# Patient Record
Sex: FEMALE | Race: WHITE | NOT HISPANIC OR LATINO | URBAN - NONMETROPOLITAN AREA
[De-identification: names, ages, dates, MRNs, and addresses within clinical notes are randomized per-mention and may not be internally consistent; named-entity substitution may affect disease eponyms.]

---

## 2019-09-13 ENCOUNTER — OFFICE VISIT (OUTPATIENT)
Dept: OBSTETRICS AND GYNECOLOGY | Facility: CLINIC | Age: 39
End: 2019-09-13

## 2019-09-13 VITALS
WEIGHT: 254.2 LBS | BODY MASS INDEX: 42.35 KG/M2 | DIASTOLIC BLOOD PRESSURE: 72 MMHG | HEIGHT: 65 IN | HEART RATE: 83 BPM | SYSTOLIC BLOOD PRESSURE: 120 MMHG

## 2019-09-13 DIAGNOSIS — E66.01 MORBID OBESITY WITH BMI OF 40.0-44.9, ADULT (HCC): ICD-10-CM

## 2019-09-13 DIAGNOSIS — E03.9 HYPOTHYROIDISM (ACQUIRED): ICD-10-CM

## 2019-09-13 DIAGNOSIS — L68.0 FEMALE HIRSUTISM: Primary | ICD-10-CM

## 2019-09-13 DIAGNOSIS — F32.81 PMDD (PREMENSTRUAL DYSPHORIC DISORDER): ICD-10-CM

## 2019-09-13 LAB
ALBUMIN SERPL-MCNC: 4.2 G/DL (ref 3.5–5)
ALBUMIN/GLOB SERPL: 1.3 G/DL (ref 1.1–1.8)
ALP SERPL-CCNC: 75 U/L (ref 38–126)
ALT SERPL W P-5'-P-CCNC: 21 U/L
ANION GAP SERPL CALCULATED.3IONS-SCNC: 11 MMOL/L (ref 5–15)
AST SERPL-CCNC: 22 U/L (ref 14–36)
BASOPHILS # BLD AUTO: 0.05 10*3/MM3 (ref 0–0.2)
BASOPHILS NFR BLD AUTO: 0.5 % (ref 0–1.5)
BILIRUB SERPL-MCNC: 0.5 MG/DL (ref 0.2–1.3)
BUN BLD-MCNC: 14 MG/DL (ref 7–23)
BUN/CREAT SERPL: 20 (ref 7–25)
CALCIUM SPEC-SCNC: 9.2 MG/DL (ref 8.4–10.2)
CHLORIDE SERPL-SCNC: 104 MMOL/L (ref 101–112)
CO2 SERPL-SCNC: 26 MMOL/L (ref 22–30)
CREAT BLD-MCNC: 0.7 MG/DL (ref 0.52–1.04)
DEPRECATED RDW RBC AUTO: 41.5 FL (ref 37–54)
EOSINOPHIL # BLD AUTO: 0.11 10*3/MM3 (ref 0–0.4)
EOSINOPHIL NFR BLD AUTO: 1.1 % (ref 0.3–6.2)
ERYTHROCYTE [DISTWIDTH] IN BLOOD BY AUTOMATED COUNT: 12.8 % (ref 12.3–15.4)
GFR SERPL CREATININE-BSD FRML MDRD: 94 ML/MIN/1.73 (ref 64–149)
GLOBULIN UR ELPH-MCNC: 3.2 GM/DL (ref 2.3–3.5)
GLUCOSE BLD-MCNC: 93 MG/DL (ref 70–99)
HCT VFR BLD AUTO: 39.4 % (ref 34–46.6)
HGB BLD-MCNC: 13.2 G/DL (ref 12–15.9)
LYMPHOCYTES # BLD AUTO: 2.67 10*3/MM3 (ref 0.7–3.1)
LYMPHOCYTES NFR BLD AUTO: 27.4 % (ref 19.6–45.3)
MCH RBC QN AUTO: 30.3 PG (ref 26.6–33)
MCHC RBC AUTO-ENTMCNC: 33.5 G/DL (ref 31.5–35.7)
MCV RBC AUTO: 90.6 FL (ref 79–97)
MONOCYTES # BLD AUTO: 0.75 10*3/MM3 (ref 0.1–0.9)
MONOCYTES NFR BLD AUTO: 7.7 % (ref 5–12)
NEUTROPHILS # BLD AUTO: 6.16 10*3/MM3 (ref 1.7–7)
NEUTROPHILS NFR BLD AUTO: 63.3 % (ref 42.7–76)
PLATELET # BLD AUTO: 359 10*3/MM3 (ref 140–450)
PMV BLD AUTO: 9.3 FL (ref 6–12)
POTASSIUM BLD-SCNC: 4.1 MMOL/L (ref 3.4–5)
PROT SERPL-MCNC: 7.4 G/DL (ref 6.3–8.6)
RBC # BLD AUTO: 4.35 10*6/MM3 (ref 3.77–5.28)
SODIUM BLD-SCNC: 141 MMOL/L (ref 137–145)
WBC NRBC COR # BLD: 9.74 10*3/MM3 (ref 3.4–10.8)

## 2019-09-13 PROCEDURE — 84402 ASSAY OF FREE TESTOSTERONE: CPT | Performed by: NURSE PRACTITIONER

## 2019-09-13 PROCEDURE — 99213 OFFICE O/P EST LOW 20 MIN: CPT | Performed by: NURSE PRACTITIONER

## 2019-09-13 PROCEDURE — 84144 ASSAY OF PROGESTERONE: CPT | Performed by: NURSE PRACTITIONER

## 2019-09-13 PROCEDURE — 84443 ASSAY THYROID STIM HORMONE: CPT | Performed by: NURSE PRACTITIONER

## 2019-09-13 PROCEDURE — 80053 COMPREHEN METABOLIC PANEL: CPT | Performed by: NURSE PRACTITIONER

## 2019-09-13 PROCEDURE — 85025 COMPLETE CBC W/AUTO DIFF WBC: CPT | Performed by: NURSE PRACTITIONER

## 2019-09-13 PROCEDURE — 83525 ASSAY OF INSULIN: CPT | Performed by: NURSE PRACTITIONER

## 2019-09-13 PROCEDURE — 84439 ASSAY OF FREE THYROXINE: CPT | Performed by: NURSE PRACTITIONER

## 2019-09-13 PROCEDURE — 84403 ASSAY OF TOTAL TESTOSTERONE: CPT | Performed by: NURSE PRACTITIONER

## 2019-09-13 RX ORDER — LEVOTHYROXINE SODIUM 88 UG/1
TABLET ORAL
Refills: 3 | COMMUNITY
Start: 2019-09-10 | End: 2020-02-25 | Stop reason: SDUPTHER

## 2019-09-13 NOTE — PROGRESS NOTES
"Subjective   Vanda Rendon is a 39 y.o. female.     History of Present Illness   Pt presents with numerous concerns and requesting to evaluate her hormones. Pt complains of weight gain and inability to lose it, mood swings, breast tenderness associated with PMS, hirsutism. Symptoms have really escalated over the last 6 months. Her moods and weight are her 2 biggest concerns. Strong family hx of diabetes. Pt states she has tried specific diets, cut out sodas and drinks only juice or water, exercised and still gained 1-2 lb a week. Her average weight previously was in the 170's and now she is 254lb today. She does have hypothyroidism and hasn't had it checked in 4-6 months. Her PCP has moved and pt is needing to establish care with a new PCP.     Pt states she feels like she has PMS 3 weeks out of the month, then the week after her period, feels great. Patient's last menstrual period was 2019 (exact date). Periods are 3-4 days, regular, heavy flow soaking an overnight pad every 1-2 hours the first 2 days, severe cramps, nausea, headaches, and breast tenderness. Pt denies drinking caffeine. She wears a sports bra to bed to help with the pain when it occurs.     Her mood swings are severe. She is tearful in discussing it. Feels she has outburst and depressive/anxious episodes. She has been on zoloft \"probably 20 years\", has never tried another antidepressant but is willing to. She was on klonopin and was weaned off too quickly. Ended up in the hospital as a result. She was switched to Buspar 5mg which she takes 1-3 x a day with some improvement.      5SAB. Pt denies every having trouble getting pregnant but had numerous SAB in first trimester. She notes excessive, dark, coarse, hair growth over the last few years on the chin, upper lip, chest and abdomen. She feels her hair is thinning on her head quickly. Has hx of ovarian cysts. She has 3 aunts with PCOS. She doesn't believe she has ever been tested. "     She recently went through a divorce and is no longer sexually active. But states in the 2 years prior to that, she had little to no desire for sex.     The following portions of the patient's history were reviewed and updated as appropriate: allergies, current medications, past family history, past medical history, past social history, past surgical history and problem list.    Review of Systems   Constitutional: Positive for fatigue and unexpected weight gain (with inability to lose it despite diet and exercise). Negative for chills, fever and unexpected weight loss.   Respiratory: Negative.    Cardiovascular: Negative.    Endocrine: Negative for cold intolerance, heat intolerance, polydipsia, polyphagia and polyuria.   Genitourinary: Positive for breast pain, menstrual problem (heavy, painful) and pelvic pain (LLQ worsened by movement. Agrees to further address at follow up when we do a pelvic exam).   Skin:        Hirsutism, hair loss   Psychiatric/Behavioral: Positive for agitation, depressed mood and stress. Negative for suicidal ideas. The patient is nervous/anxious.         Mood swings, yelling at her kids       Objective    Vitals:    09/13/19 0841   BP: 120/72   Pulse: 83         09/13/19  0841   Weight: 115 kg (254 lb 3.2 oz)     Body mass index is 42.3 kg/m².    Physical Exam   Constitutional: She is oriented to person, place, and time. She appears well-developed and well-nourished. She is morbidly obese.  Cardiovascular: Normal rate, regular rhythm and normal heart sounds.   Pulmonary/Chest: Effort normal and breath sounds normal.   Neurological: She is alert and oriented to person, place, and time.   Skin:   Moderate hirsutism on the chin, chest and abdomen   Psychiatric: Her mood appears anxious. She exhibits a depressed mood. She expresses no homicidal and no suicidal ideation.   Tearful talking about her moods   Vitals reviewed.        Assessment/Plan   Vanda was seen today for establish  care.    Diagnoses and all orders for this visit:    Female hirsutism  -     Testosterone, Free, Total  -     TSH  -     T4, Free    PMDD (premenstrual dysphoric disorder)  -     Testosterone, Free, Total  -     Progesterone  -     TSH  -     T4, Free    Morbid obesity with BMI of 40.0-44.9, adult (CMS/[facility])  -     Insulin, Total  -     CBC & Differential  -     Comprehensive Metabolic Panel  -     TSH  -     T4, Free  -     CBC Auto Differential    Hypothyroidism (acquired)  -     TSH  -     T4, Free      I discussed possible causes for her concerns and the probability that it may be multifactorial. Possibly hypothyroidism that isn't well controlled, PCOS, hormonal imbalance, hyperinsulinemia, etc. Possible plans may include: adjusting synthroid, taking spironolactone for hair changes, Wellbutrin for mood, weight and libido, metformin if insulin resistant. Pt is fasting today and agrees to obtain the labs above upon leaving my office. I will call with results and give a brief overview then schedule pt for follow up and a complete well woman exam. Pt agrees with this plan of care.

## 2019-09-14 LAB
PROGEST SERPL-MCNC: 4.51 NG/ML
T4 FREE SERPL-MCNC: 1.28 NG/DL (ref 0.93–1.7)
TSH SERPL DL<=0.05 MIU/L-ACNC: 1.89 UIU/ML (ref 0.27–4.2)

## 2019-09-16 LAB
INSULIN SERPL-ACNC: 12.2 UIU/ML (ref 2.6–24.9)
TESTOST FREE SERPL-MCNC: 0.4 PG/ML (ref 0–4.2)
TESTOST SERPL-MCNC: 11 NG/DL (ref 8–48)

## 2020-02-25 ENCOUNTER — OFFICE VISIT (OUTPATIENT)
Dept: OBSTETRICS AND GYNECOLOGY | Facility: CLINIC | Age: 40
End: 2020-02-25

## 2020-02-25 VITALS
BODY MASS INDEX: 44.08 KG/M2 | HEART RATE: 91 BPM | WEIGHT: 264.6 LBS | SYSTOLIC BLOOD PRESSURE: 118 MMHG | DIASTOLIC BLOOD PRESSURE: 72 MMHG | HEIGHT: 65 IN

## 2020-02-25 DIAGNOSIS — Z01.419 ENCOUNTER FOR GYNECOLOGICAL EXAMINATION WITH PAPANICOLAOU SMEAR OF CERVIX: Primary | ICD-10-CM

## 2020-02-25 DIAGNOSIS — E66.01 MORBID OBESITY WITH BMI OF 40.0-44.9, ADULT (HCC): ICD-10-CM

## 2020-02-25 DIAGNOSIS — F41.9 ANXIETY: ICD-10-CM

## 2020-02-25 DIAGNOSIS — F32.0 CURRENT MILD EPISODE OF MAJOR DEPRESSIVE DISORDER WITHOUT PRIOR EPISODE (HCC): ICD-10-CM

## 2020-02-25 DIAGNOSIS — E03.9 HYPOTHYROIDISM (ACQUIRED): ICD-10-CM

## 2020-02-25 PROCEDURE — G0123 SCREEN CERV/VAG THIN LAYER: HCPCS | Performed by: NURSE PRACTITIONER

## 2020-02-25 PROCEDURE — 99395 PREV VISIT EST AGE 18-39: CPT | Performed by: NURSE PRACTITIONER

## 2020-02-25 PROCEDURE — 87624 HPV HI-RISK TYP POOLED RSLT: CPT | Performed by: NURSE PRACTITIONER

## 2020-02-25 PROCEDURE — 99214 OFFICE O/P EST MOD 30 MIN: CPT | Performed by: NURSE PRACTITIONER

## 2020-02-25 RX ORDER — BUSPIRONE HYDROCHLORIDE 5 MG/1
5 TABLET ORAL 3 TIMES DAILY
Qty: 90 TABLET | Refills: 5 | Status: SHIPPED | OUTPATIENT
Start: 2020-02-25 | End: 2020-06-23

## 2020-02-25 RX ORDER — BUPROPION HYDROCHLORIDE 150 MG/1
150 TABLET ORAL EVERY MORNING
Qty: 30 TABLET | Refills: 2 | Status: SHIPPED | OUTPATIENT
Start: 2020-02-25 | End: 2020-03-17 | Stop reason: SDUPTHER

## 2020-02-25 RX ORDER — LEVOTHYROXINE SODIUM 88 UG/1
88 TABLET ORAL DAILY
Qty: 30 TABLET | Refills: 2 | Status: SHIPPED | OUTPATIENT
Start: 2020-02-25 | End: 2020-05-29 | Stop reason: SDUPTHER

## 2020-02-25 NOTE — PROGRESS NOTES
Subjective   Chief Complaint   Patient presents with   • Gynecologic Exam     well woman annual visit   • Follow-up     female hirsutism, PMDD, obesity and hypothyroidism     Vanda Rendon is a 39 y.o. year old  presenting to be seen for her annual exam.  Today she is also following up on labs/create a plan for PMDD, obesity, hypothyroidism, and hirsutism. I saw pt for this in September. Pt had a death in the family and some significant life stressors that prevented her RTC sooner. She has been without all of her medications (synthroid, zoloft and buspar) for 2 months. Her thyroid was well managed on synthroid 88mcg dose in September, I will give her refills until she can get established with a PCP for ongoing management. Pt would like to move forward with Wellbutrin as we had previously discussed. It has been many years since she took it. She felt zoloft was no longer helping her. She would like to keep the buspar on hand PRN.     Pt has been calorie tracking and restricting since I last saw her. She states in Nov and December she was in the 280's. She has lost about 15-16lb according to her home scale. She still has 10lb to go to get back to the weight she was in September. Pt states she had no idea that she was consuming 4615-0149 calories late at night alone due to snacking. She has drastically cut back on snacks, making healthier alternatives and using moderation. She looks forward to being more physically active as the weather improves but states she can already tell a difference, even in her mood, since she has been physically active.      Patient's last menstrual period was 2020 (exact date).    OB History        8    Para   3    Term                AB   5    Living           SAB   5    TAB        Ectopic        Molar        Multiple        Live Births                    Current birth control method: abstinence.    She is not sexually active.  In the past 12 months there  has not been new sexual partners.      Past 6 month menstrual history:    Cycle Frequency: regular, predictable and consistent every 28 - 32 days   Except in Nov and December she had spotting and skipped Dec all together. Normal in  and Feb.    Menstrual cycle character: flow is typically normal   Cycle Duration: 3 - 4   Number of heavy days of flows: 0   Dysmenorrhea: mild and is not affecting her activities of daily living   PMS: is not affecting her activities of daily living   Intermenstrual bleeding present: no   Post-coital bleeding present: no     She exercises regularly: yes.  She wears her seat belt:yes.  She has concerns about domestic violence: no.  Last colonoscopy: Never  Last DEXA: Never  Last MMG: Never  Last pap: 4/27/15  History of abnormal PAP: no    The following portions of the patient's history were reviewed and updated as appropriate:problem list, current medications, allergies, past family history, past medical history, past social history and past surgical history.    Social History    Tobacco Use      Smoking status: Former Smoker        Quit date:         Years since quittin.1      Smokeless tobacco: Never Used    Social History     Substance and Sexual Activity   Alcohol Use No      Review of Systems   Constitutional: Negative.  Negative for chills and fever.        Intentional weight loss   Respiratory: Negative.    Cardiovascular: Negative.    Gastrointestinal: Negative.  Negative for constipation and diarrhea.   Endocrine: Negative.  Negative for cold intolerance and heat intolerance.   Genitourinary: Negative.  Negative for menstrual problem, vaginal bleeding, vaginal discharge and vaginal pain.   Skin: Negative.         hirsutism   Neurological: Negative for dizziness, syncope, light-headedness and headaches.   Psychiatric/Behavioral: Negative for suicidal ideas. The patient is not nervous/anxious.         Mood improving with exercise but still feels like she needs to move  "forward with Wellbutrin         Objective   /72   Pulse 91   Ht 165.1 cm (65\")   Wt 120 kg (264 lb 9.6 oz)   LMP 02/18/2020 (Exact Date)   Breastfeeding No   BMI 44.03 kg/m²     General:  well developed; well nourished  no acute distress  obese - Body mass index is 44.03 kg/m².   Skin:  No suspicious lesions seen   Thyroid: not examined   Breasts:  Examined in supine position  Symmetric without masses or skin dimpling  Nipples normal without inversion, lesions or discharge  There are no palpable axillary nodes   Abdomen: soft, non-tender; no masses  no umbilical or inguinal hernias are present  no hepato-splenomegaly  Normal findings: bowel sounds normal   Cardiac: Heart sounds are normal.  Regular rate and rhythm without murmur, gallop or rub.   Resp: Normal expansion.  Clear to auscultation.  No rales, rhonchi, or wheezing.   Psych: alert,oriented, in NAD with a full range of affect, normal behavior and no psychotic features   Pelvis: Uterus:  Exam limited by  body habitus  Clinical staff was present for exam  External genitalia:  normal appearance of the external genitalia including Bartholin's and Neal's glands.  :  urethral meatus normal;  Vaginal:  normal pink mucosa without prolapse or lesions  Cervix:  normal appearance.  Uterus:  normal size, shape and consistency  Adnexa:  normal bimanual exam of the adnexa.  Rectal:  digital rectal exam not performed; anus visually normal appearing.     Lab Review   CBC, CMP, TSH and insulin, progesterone, testosterone    Imaging  No data reviewed       Vanda was seen today for gynecologic exam and follow-up.    Diagnoses and all orders for this visit:    Encounter for gynecological examination with Papanicolaou smear of cervix  -     Liquid-based Pap Smear, Screening    Morbid obesity with BMI of 40.0-44.9, adult (CMS/[facility])  -     buPROPion XL (WELLBUTRIN XL) 150 MG 24 hr tablet; Take 1 tablet by mouth Every Morning.    Current mild episode of major " depressive disorder without prior episode (CMS/HCC)  -     buPROPion XL (WELLBUTRIN XL) 150 MG 24 hr tablet; Take 1 tablet by mouth Every Morning.    Anxiety  -     busPIRone (BUSPAR) 5 MG tablet; Take 1 tablet by mouth 3 (Three) Times a Day.    Hypothyroidism (acquired)  -     levothyroxine (SYNTHROID, LEVOTHROID) 88 MCG tablet; Take 1 tablet by mouth Daily.    Pap results: I will send card in mail or call if abnormal. RTC annually for well woman exams. Monitor periods and let me know if they become irregular and bothersome again.    Pt is in agreement with beginning Wellbutrin for both PMDD and assistance with appetite/weight loss. I praised pt for the weight she has lost and the means by which she is doing it. Encouraged her to continue calorie tracking, increase exercise as she builds endurance,  Cutting back on carbs and sugary foods and choosing healthier options and using moderation. Goal of 1-2lb lose per week. Set small goals of 5-10lb to encourage her more along the way.     Continue with Buspar TID PRN. Pt wants to see how she feels without it at first but if she feels her anxiety beginning again, would want to start back on it.     Restart synthroid 88mcg. Encouraged pt to schedule with a PCP like Lisa Kramer or Larissa Munoz for ongoing monitoring and management.     Pt agrees with this plan. RTC in 3 months or sooner PRN.       This note was electronically signed.    Christianne Thompson, APRN  February 25, 2020

## 2020-02-28 LAB
GEN CATEG CVX/VAG CYTO-IMP: NORMAL
LAB AP CASE REPORT: NORMAL
LAB AP GYN ADDITIONAL INFORMATION: NORMAL
LAB AP GYN OTHER FINDINGS: NORMAL
PATH INTERP SPEC-IMP: NORMAL
STAT OF ADQ CVX/VAG CYTO-IMP: NORMAL

## 2020-03-02 LAB — HPV I/H RISK 4 DNA CVX QL PROBE+SIG AMP: NEGATIVE

## 2020-03-17 DIAGNOSIS — F32.0 CURRENT MILD EPISODE OF MAJOR DEPRESSIVE DISORDER WITHOUT PRIOR EPISODE (HCC): ICD-10-CM

## 2020-03-17 DIAGNOSIS — E66.01 MORBID OBESITY WITH BMI OF 40.0-44.9, ADULT (HCC): ICD-10-CM

## 2020-03-17 RX ORDER — BUPROPION HYDROCHLORIDE 300 MG/1
300 TABLET ORAL EVERY MORNING
Qty: 30 TABLET | Refills: 2 | Status: SHIPPED | OUTPATIENT
Start: 2020-03-17 | End: 2020-06-23 | Stop reason: SDUPTHER

## 2020-03-17 NOTE — PROGRESS NOTES
Wellbutrin XL 150mg was started 4 weeks ago. Pt states she hasn't noticed any improvement in her mood but does feel it is helping her appetite. She inquires about a dose change. Given the coronavirus pandemic and cancellation of all non urgent visits, I will go ahead and send XL 300mg tablets to finish the next 2 months, until pt is due for a follow up anyway. Pt agrees with this plan and will call with any concerns. She wants to call next month to schedule this appt.

## 2020-05-29 DIAGNOSIS — F32.0 CURRENT MILD EPISODE OF MAJOR DEPRESSIVE DISORDER WITHOUT PRIOR EPISODE (HCC): ICD-10-CM

## 2020-05-29 DIAGNOSIS — E03.9 HYPOTHYROIDISM (ACQUIRED): ICD-10-CM

## 2020-05-29 DIAGNOSIS — E66.01 MORBID OBESITY WITH BMI OF 40.0-44.9, ADULT (HCC): ICD-10-CM

## 2020-05-29 RX ORDER — BUPROPION HYDROCHLORIDE 300 MG/1
300 TABLET ORAL EVERY MORNING
Qty: 30 TABLET | Refills: 2 | OUTPATIENT
Start: 2020-05-29 | End: 2021-05-29

## 2020-06-01 RX ORDER — LEVOTHYROXINE SODIUM 88 UG/1
88 TABLET ORAL DAILY
Qty: 30 TABLET | Refills: 0 | Status: SHIPPED | OUTPATIENT
Start: 2020-06-01 | End: 2020-06-23 | Stop reason: SDUPTHER

## 2020-06-23 ENCOUNTER — OFFICE VISIT (OUTPATIENT)
Dept: OBSTETRICS AND GYNECOLOGY | Facility: CLINIC | Age: 40
End: 2020-06-23

## 2020-06-23 DIAGNOSIS — E66.01 MORBID OBESITY WITH BMI OF 40.0-44.9, ADULT (HCC): ICD-10-CM

## 2020-06-23 DIAGNOSIS — E03.9 HYPOTHYROIDISM (ACQUIRED): ICD-10-CM

## 2020-06-23 DIAGNOSIS — N92.6 IRREGULAR PERIODS: ICD-10-CM

## 2020-06-23 DIAGNOSIS — F32.0 CURRENT MILD EPISODE OF MAJOR DEPRESSIVE DISORDER WITHOUT PRIOR EPISODE (HCC): ICD-10-CM

## 2020-06-23 DIAGNOSIS — F32.81 PMDD (PREMENSTRUAL DYSPHORIC DISORDER): Primary | ICD-10-CM

## 2020-06-23 PROCEDURE — 99442 PR PHYS/QHP TELEPHONE EVALUATION 11-20 MIN: CPT | Performed by: NURSE PRACTITIONER

## 2020-06-23 RX ORDER — LEVOTHYROXINE SODIUM 88 UG/1
88 TABLET ORAL DAILY
Qty: 30 TABLET | Refills: 2 | Status: SHIPPED | OUTPATIENT
Start: 2020-06-23 | End: 2020-09-16 | Stop reason: SDUPTHER

## 2020-06-23 RX ORDER — BUPROPION HYDROCHLORIDE 300 MG/1
300 TABLET ORAL EVERY MORNING
Qty: 30 TABLET | Refills: 2 | Status: SHIPPED | OUTPATIENT
Start: 2020-06-23 | End: 2020-09-16 | Stop reason: SDUPTHER

## 2020-06-23 RX ORDER — MEDROXYPROGESTERONE ACETATE 5 MG/1
5 TABLET ORAL DAILY
Qty: 30 TABLET | Refills: 2 | Status: SHIPPED | OUTPATIENT
Start: 2020-06-23 | End: 2020-09-16 | Stop reason: SDUPTHER

## 2020-06-24 NOTE — PROGRESS NOTES
Subjective   Vanda Rendon is a 40 y.o. female.     You have chosen to receive care through a telephone visit. Do you consent to use a telephone visit for your medical care today? Yes    History of Present Illness   Pt presents for follow up on Wellbutrin XL 300mg. She was scheduled for follow up with COVID hit, so we talked on the phone and discussed that she was improving with her appetite but depression was still an issue. We increased her dose to 300mg on 3/17. Today she continues to say her appetite suppression with Wellbutrin is great. She believes she is 248lb which would correlate to 16lb loss. Pt praised for her efforts and success.     She does note her depression overall to be better but has concerns about irritability and anger before her period. She states is just seems worse lately whenever she skipped her period in May. She has since had one on 6/2/2020 and feels better for now. Her bleeding is heavy for the first 3 days, then tapers off over the next 2-3 days. Pt is not sexually active.     She continues to take Synthroid 88mcg without concern.     The following portions of the patient's history were reviewed and updated as appropriate: allergies, current medications, past family history, past medical history, past social history, past surgical history and problem list.    Review of Systems   Constitutional: Positive for appetite change (decreased on Wellbutrin). Negative for chills, fever, unexpected weight gain and unexpected weight loss (believes she is approx 248lb according to her home scales. intentional loss).   Respiratory: Negative.  Negative for shortness of breath.    Cardiovascular: Negative.  Negative for palpitations.   Endocrine:        Hypothyroidism   Genitourinary: Positive for menstrual problem (irregular, heavy) and pelvic pain (dysmenorrhea).   Neurological: Negative for dizziness, syncope and light-headedness.   Psychiatric/Behavioral: Positive for agitation (and anger  the week before her period. ). Negative for depressed mood. The patient is not nervous/anxious.        Objective   Physical Exam  Telephone Visit     Assessment/Plan   Vanda was seen today for med refill.    Diagnoses and all orders for this visit:    PMDD (premenstrual dysphoric disorder)  -     medroxyPROGESTERone (PROVERA) 5 MG tablet; Take 1 tablet by mouth Daily.    Irregular periods  -     medroxyPROGESTERone (PROVERA) 5 MG tablet; Take 1 tablet by mouth Daily.    Morbid obesity with BMI of 40.0-44.9, adult (CMS/HCC)  -     buPROPion XL (WELLBUTRIN XL) 300 MG 24 hr tablet; Take 1 tablet by mouth Every Morning.    Current mild episode of major depressive disorder without prior episode (CMS/HCC)  -     buPROPion XL (WELLBUTRIN XL) 300 MG 24 hr tablet; Take 1 tablet by mouth Every Morning.    Hypothyroidism (acquired)  -     levothyroxine (SYNTHROID, LEVOTHROID) 88 MCG tablet; Take 1 tablet by mouth Daily.    Continue Wellbutrin at 300mg XL tablets. She has had success with appetite and depression. Her concerns today are more irritability and anger before her period. She says it is affecting her little girls. We discussed options and a possible cause for PMDD and irregular periods is progesterone. I recommend we start daily provera 5 mg. We discussed realistic expectations of effectiveness and potential for irregular bleeding but in hopes that it would lighten it up and make it less severe. Encouraged pt to continue it during the transition phase unless something severe was going on. Encouraged her to call the office if she had any questions. She can begin this after her next period.     She has not been able to establish with a new PCP due to COVID. Continue Synthroid. And seek out a PCP ASAP so that they can continue monitoring and managing her thyroid.    RTC in 3 months. Schedule Sept 16th at 4:00PM.    Pt agrees with plan of care. All questions answered.     Total Time: 20 minutes.

## 2020-09-16 ENCOUNTER — OFFICE VISIT (OUTPATIENT)
Dept: OBSTETRICS AND GYNECOLOGY | Facility: CLINIC | Age: 40
End: 2020-09-16

## 2020-09-16 VITALS — BODY MASS INDEX: 41.6 KG/M2 | WEIGHT: 250 LBS

## 2020-09-16 DIAGNOSIS — N92.6 IRREGULAR PERIODS: ICD-10-CM

## 2020-09-16 DIAGNOSIS — E66.01 MORBID OBESITY WITH BMI OF 40.0-44.9, ADULT (HCC): ICD-10-CM

## 2020-09-16 DIAGNOSIS — F32.81 PMDD (PREMENSTRUAL DYSPHORIC DISORDER): ICD-10-CM

## 2020-09-16 DIAGNOSIS — E03.9 HYPOTHYROIDISM (ACQUIRED): ICD-10-CM

## 2020-09-16 DIAGNOSIS — F32.0 CURRENT MILD EPISODE OF MAJOR DEPRESSIVE DISORDER WITHOUT PRIOR EPISODE (HCC): ICD-10-CM

## 2020-09-16 PROCEDURE — 99442 PR PHYS/QHP TELEPHONE EVALUATION 11-20 MIN: CPT | Performed by: NURSE PRACTITIONER

## 2020-09-16 RX ORDER — LEVOTHYROXINE SODIUM 88 UG/1
88 TABLET ORAL DAILY
Qty: 30 TABLET | Refills: 5 | Status: SHIPPED | OUTPATIENT
Start: 2020-09-16 | End: 2021-05-17 | Stop reason: SDUPTHER

## 2020-09-16 RX ORDER — MEDROXYPROGESTERONE ACETATE 10 MG/1
10 TABLET ORAL DAILY
Qty: 30 TABLET | Refills: 5 | Status: SHIPPED | OUTPATIENT
Start: 2020-09-16 | End: 2021-03-05 | Stop reason: SDUPTHER

## 2020-09-16 RX ORDER — BUPROPION HYDROCHLORIDE 300 MG/1
300 TABLET ORAL EVERY MORNING
Qty: 30 TABLET | Refills: 5 | Status: SHIPPED | OUTPATIENT
Start: 2020-09-16 | End: 2021-05-14 | Stop reason: SDUPTHER

## 2020-09-18 NOTE — PROGRESS NOTES
Subjective   Vanda Rendon is a 40 y.o. female.     You have chosen to receive care through a telephone visit. Do you consent to use a telephone visit for your medical care today? Yes    History of Present Illness   Pt presents for follow up on Wellbutrin XL 300mg and provera 5mg. We increased her dose to 300mg on 3/17 and continued on that dose at last check in. But she noted her depression overall to be better but had concerns about irritability and anger before her period. Her bleeding was also heavy. Pt is not sexually active and declines contraception.       Today she continues to say her appetite suppression with Wellbutrin is great. She believes she has had a 16lb loss. Pt praised for her efforts and success. She feels a drastic improvement in her PMDD since provera initiation. She feels there is still room for improvement but she can tell more of a change with provera than she could when she just took wellbutrin alone. She inquires about a dose change if possible. Bleeding has also regulated, shortened to 5 days, and become more moderate flow.     She continues to take Synthroid 88mcg without concern.     The following portions of the patient's history were reviewed and updated as appropriate: allergies, current medications, past family history, past medical history, past social history, past surgical history and problem list.    Review of Systems   Constitutional: Positive for appetite change (decreased on Wellbutrin). Negative for chills, fever, unexpected weight gain and unexpected weight loss (Intentional loss ).   Endocrine:        Hypothyroidism   Genitourinary: Negative for menstrual problem (regular, moderate) and pelvic pain (improved).   Skin: Negative.    Neurological: Negative for dizziness, syncope, light-headedness and headache.   Psychiatric/Behavioral: Positive for agitation (and anger the week before her period improving significantly ). Negative for depressed mood. The patient is  not nervous/anxious.        Objective   Physical Exam  Telephone Visit     Assessment/Plan   Vanda was seen today for follow-up.    Diagnoses and all orders for this visit:    PMDD (premenstrual dysphoric disorder)  -     medroxyPROGESTERone (PROVERA) 10 MG tablet; Take 1 tablet by mouth Daily.    Irregular periods  -     medroxyPROGESTERone (PROVERA) 10 MG tablet; Take 1 tablet by mouth Daily.    Morbid obesity with BMI of 40.0-44.9, adult (CMS/HCC)  -     buPROPion XL (WELLBUTRIN XL) 300 MG 24 hr tablet; Take 1 tablet by mouth Every Morning.    Current mild episode of major depressive disorder without prior episode (CMS/HCC)  -     buPROPion XL (WELLBUTRIN XL) 300 MG 24 hr tablet; Take 1 tablet by mouth Every Morning.    Hypothyroidism (acquired)  -     levothyroxine (SYNTHROID, LEVOTHROID) 88 MCG tablet; Take 1 tablet by mouth Daily.    Continue Wellbutrin at 300mg XL tablets. She has had success with appetite and depression. Praised for weight loss efforts.    Continue provera but increase to 10mg. She has had no negative side effects and has had positive changes in PMDD and bleeding but still see's room for improvement. Pt can cut back to 5mg if she has any concerns after dose increase.     Continue Synthroid. Refills sent.     RTC in 6 months for well woman exam or sooner PRN.     Pt agrees with plan of care. All questions answered.     Total Time: 15 minutes.

## 2021-03-05 DIAGNOSIS — F32.81 PMDD (PREMENSTRUAL DYSPHORIC DISORDER): ICD-10-CM

## 2021-03-05 DIAGNOSIS — N92.6 IRREGULAR PERIODS: ICD-10-CM

## 2021-03-05 RX ORDER — MEDROXYPROGESTERONE ACETATE 10 MG/1
10 TABLET ORAL DAILY
Qty: 30 TABLET | Refills: 0 | Status: SHIPPED | OUTPATIENT
Start: 2021-03-05 | End: 2021-05-14 | Stop reason: SDUPTHER

## 2021-04-12 DIAGNOSIS — F32.0 CURRENT MILD EPISODE OF MAJOR DEPRESSIVE DISORDER WITHOUT PRIOR EPISODE (HCC): ICD-10-CM

## 2021-04-12 DIAGNOSIS — E66.01 MORBID OBESITY WITH BMI OF 40.0-44.9, ADULT (HCC): ICD-10-CM

## 2021-04-12 DIAGNOSIS — E03.9 HYPOTHYROIDISM (ACQUIRED): ICD-10-CM

## 2021-04-12 RX ORDER — BUPROPION HYDROCHLORIDE 300 MG/1
300 TABLET ORAL EVERY MORNING
Qty: 30 TABLET | Refills: 5 | OUTPATIENT
Start: 2021-04-12 | End: 2022-04-12

## 2021-04-12 RX ORDER — LEVOTHYROXINE SODIUM 88 UG/1
88 TABLET ORAL DAILY
Qty: 30 TABLET | Refills: 5 | OUTPATIENT
Start: 2021-04-12

## 2021-05-14 ENCOUNTER — LAB (OUTPATIENT)
Dept: LAB | Facility: OTHER | Age: 41
End: 2021-05-14

## 2021-05-14 ENCOUNTER — OFFICE VISIT (OUTPATIENT)
Dept: OBSTETRICS AND GYNECOLOGY | Facility: CLINIC | Age: 41
End: 2021-05-14

## 2021-05-14 VITALS
SYSTOLIC BLOOD PRESSURE: 118 MMHG | HEIGHT: 65 IN | WEIGHT: 265.2 LBS | DIASTOLIC BLOOD PRESSURE: 76 MMHG | BODY MASS INDEX: 44.18 KG/M2 | HEART RATE: 96 BPM

## 2021-05-14 DIAGNOSIS — E55.9 VITAMIN D DEFICIENCY: ICD-10-CM

## 2021-05-14 DIAGNOSIS — E03.9 HYPOTHYROIDISM (ACQUIRED): ICD-10-CM

## 2021-05-14 DIAGNOSIS — Z01.419 ENCOUNTER FOR GYNECOLOGICAL EXAMINATION WITH PAPANICOLAOU SMEAR OF CERVIX: Primary | ICD-10-CM

## 2021-05-14 DIAGNOSIS — F32.0 CURRENT MILD EPISODE OF MAJOR DEPRESSIVE DISORDER WITHOUT PRIOR EPISODE (HCC): ICD-10-CM

## 2021-05-14 DIAGNOSIS — E66.01 MORBID OBESITY WITH BMI OF 40.0-44.9, ADULT (HCC): ICD-10-CM

## 2021-05-14 DIAGNOSIS — Z00.00 LABORATORY EXAMINATION ORDERED AS PART OF A ROUTINE GENERAL MEDICAL EXAMINATION: ICD-10-CM

## 2021-05-14 DIAGNOSIS — F32.81 PMDD (PREMENSTRUAL DYSPHORIC DISORDER): ICD-10-CM

## 2021-05-14 DIAGNOSIS — N92.6 IRREGULAR PERIODS: ICD-10-CM

## 2021-05-14 LAB
ALBUMIN SERPL-MCNC: 3.9 G/DL (ref 3.5–5)
ALBUMIN/GLOB SERPL: 1.2 G/DL (ref 1.1–1.8)
ALP SERPL-CCNC: 106 U/L (ref 38–126)
ALT SERPL W P-5'-P-CCNC: 20 U/L
ANION GAP SERPL CALCULATED.3IONS-SCNC: 9 MMOL/L (ref 5–15)
AST SERPL-CCNC: 24 U/L (ref 14–36)
BASOPHILS # BLD AUTO: 0.1 10*3/MM3 (ref 0–0.2)
BASOPHILS NFR BLD AUTO: 1.2 % (ref 0–1.5)
BILIRUB SERPL-MCNC: 0.3 MG/DL (ref 0.2–1.3)
BUN SERPL-MCNC: 10 MG/DL (ref 7–23)
BUN/CREAT SERPL: 12.5 (ref 7–25)
CALCIUM SPEC-SCNC: 9 MG/DL (ref 8.4–10.2)
CHLORIDE SERPL-SCNC: 101 MMOL/L (ref 101–112)
CHOLEST SERPL-MCNC: 248 MG/DL (ref 150–200)
CO2 SERPL-SCNC: 25 MMOL/L (ref 22–30)
CREAT SERPL-MCNC: 0.8 MG/DL (ref 0.52–1.04)
DEPRECATED RDW RBC AUTO: 39.9 FL (ref 37–54)
EOSINOPHIL # BLD AUTO: 0.14 10*3/MM3 (ref 0–0.4)
EOSINOPHIL NFR BLD AUTO: 1.6 % (ref 0.3–6.2)
ERYTHROCYTE [DISTWIDTH] IN BLOOD BY AUTOMATED COUNT: 12.4 % (ref 12.3–15.4)
GFR SERPL CREATININE-BSD FRML MDRD: 80 ML/MIN/1.73 (ref 64–149)
GLOBULIN UR ELPH-MCNC: 3.2 GM/DL (ref 2.3–3.5)
GLUCOSE SERPL-MCNC: 107 MG/DL (ref 70–99)
HCT VFR BLD AUTO: 39 % (ref 34–46.6)
HDLC SERPL-MCNC: 35 MG/DL (ref 40–59)
HGB BLD-MCNC: 13.3 G/DL (ref 12–15.9)
LDLC SERPL CALC-MCNC: 185 MG/DL
LDLC/HDLC SERPL: 5.23 {RATIO} (ref 0–3.22)
LYMPHOCYTES # BLD AUTO: 2.25 10*3/MM3 (ref 0.7–3.1)
LYMPHOCYTES NFR BLD AUTO: 26 % (ref 19.6–45.3)
MCH RBC QN AUTO: 30.6 PG (ref 26.6–33)
MCHC RBC AUTO-ENTMCNC: 34.1 G/DL (ref 31.5–35.7)
MCV RBC AUTO: 89.7 FL (ref 79–97)
MONOCYTES # BLD AUTO: 0.51 10*3/MM3 (ref 0.1–0.9)
MONOCYTES NFR BLD AUTO: 5.9 % (ref 5–12)
NEUTROPHILS NFR BLD AUTO: 5.65 10*3/MM3 (ref 1.7–7)
NEUTROPHILS NFR BLD AUTO: 65.3 % (ref 42.7–76)
PLATELET # BLD AUTO: 383 10*3/MM3 (ref 140–450)
PMV BLD AUTO: 9 FL (ref 6–12)
POTASSIUM SERPL-SCNC: 4.3 MMOL/L (ref 3.4–5)
PROT SERPL-MCNC: 7.1 G/DL (ref 6.3–8.6)
RBC # BLD AUTO: 4.35 10*6/MM3 (ref 3.77–5.28)
SODIUM SERPL-SCNC: 135 MMOL/L (ref 137–145)
TRIGL SERPL-MCNC: 150 MG/DL
VLDLC SERPL-MCNC: 28 MG/DL (ref 5–40)
WBC # BLD AUTO: 8.65 10*3/MM3 (ref 3.4–10.8)

## 2021-05-14 PROCEDURE — 80053 COMPREHEN METABOLIC PANEL: CPT | Performed by: NURSE PRACTITIONER

## 2021-05-14 PROCEDURE — 99395 PREV VISIT EST AGE 18-39: CPT | Performed by: NURSE PRACTITIONER

## 2021-05-14 PROCEDURE — 83036 HEMOGLOBIN GLYCOSYLATED A1C: CPT | Performed by: NURSE PRACTITIONER

## 2021-05-14 PROCEDURE — 82607 VITAMIN B-12: CPT | Performed by: NURSE PRACTITIONER

## 2021-05-14 PROCEDURE — 82306 VITAMIN D 25 HYDROXY: CPT | Performed by: NURSE PRACTITIONER

## 2021-05-14 PROCEDURE — 84443 ASSAY THYROID STIM HORMONE: CPT | Performed by: NURSE PRACTITIONER

## 2021-05-14 PROCEDURE — 84439 ASSAY OF FREE THYROXINE: CPT | Performed by: NURSE PRACTITIONER

## 2021-05-14 PROCEDURE — 80061 LIPID PANEL: CPT | Performed by: NURSE PRACTITIONER

## 2021-05-14 PROCEDURE — 85025 COMPLETE CBC W/AUTO DIFF WBC: CPT | Performed by: NURSE PRACTITIONER

## 2021-05-14 RX ORDER — BUPROPION HYDROCHLORIDE 150 MG/1
150 TABLET ORAL EVERY MORNING
Qty: 30 TABLET | Refills: 5 | Status: SHIPPED | OUTPATIENT
Start: 2021-05-14 | End: 2022-12-20 | Stop reason: DRUGHIGH

## 2021-05-14 RX ORDER — MEDROXYPROGESTERONE ACETATE 5 MG/1
5 TABLET ORAL DAILY
Qty: 30 TABLET | Refills: 5 | Status: SHIPPED | OUTPATIENT
Start: 2021-05-14 | End: 2022-08-19

## 2021-05-14 RX ORDER — BUSPIRONE HYDROCHLORIDE 10 MG/1
10 TABLET ORAL 2 TIMES DAILY
Qty: 60 TABLET | Refills: 1 | Status: SHIPPED | OUTPATIENT
Start: 2021-05-14 | End: 2021-06-28

## 2021-05-15 LAB
25(OH)D3 SERPL-MCNC: 9.9 NG/ML (ref 30–100)
HBA1C MFR BLD: 5.4 % (ref 4.8–5.6)
T4 FREE SERPL-MCNC: 1.21 NG/DL (ref 0.93–1.7)
TSH SERPL DL<=0.05 MIU/L-ACNC: 3.43 UIU/ML (ref 0.27–4.2)
VIT B12 BLD-MCNC: 283 PG/ML (ref 211–946)

## 2021-05-17 RX ORDER — ERGOCALCIFEROL 1.25 MG/1
50000 CAPSULE ORAL WEEKLY
Qty: 5 CAPSULE | Refills: 5 | Status: SHIPPED | OUTPATIENT
Start: 2021-05-17

## 2021-05-17 RX ORDER — LEVOTHYROXINE SODIUM 88 UG/1
88 TABLET ORAL DAILY
Qty: 30 TABLET | Refills: 5 | Status: SHIPPED | OUTPATIENT
Start: 2021-05-17 | End: 2022-12-22 | Stop reason: SDUPTHER

## 2021-05-17 NOTE — PROGRESS NOTES
Subjective   Chief Complaint   Patient presents with   • Gynecologic Exam     wwe   • medication refills     possibly needs labs for thyroid     Vanda Rendon is a 40 y.o. year old  presenting to be seen for her annual exam.  Today she is needing refills on Wellbutrin, synthroid and provera.     She has PMDD, obesity, hypothyroidism, and hirsutism.  Her thyroid was previously well managed on synthroid 88mcg dose, I will recheck labs today. Pt had done well on Wellbutrin but we increased dose to 300mg XL. She complains today of feeling anxious and worked up easily. There is a possibility the higher dose wellbutrin is contributing. She took Buspar previously and denies any negative side effects but I believe she was having trouble remembering to take if multiple times a day. She feels she could handle BID but not TID. I recommend we go up from 5mg to 10mg and do BID, not PRN as she had previously taken it. Pt is agreeable to this. With the jittery/anxiousness, I do recommend we back down on the Wellbutrin to 150mg XL. Pt agrees to this as well.     Pt is struggling with weight loss. She had gotten down closer to 250lb but is back up to 265lb today. She is inquiring about the best diet to follow. She is reading so much conflicting information online and struggling with sustainability of fad diets. We discussed weight watchers at length. She did it many, many years ago. I provided some info on it and pt agrees to start here. I explained the lifestyle change is much better and sustainable than fad dieting. Pt voices understanding.     She previously wanted to increase the provera to 10mg but today states she couldn't tolerate it due to mood changes. She has been cutting it in half daily.     Patient's last menstrual period was 2021 (exact date).    OB History        6    Para   3    Term                AB   3    Living   3       SAB   3    TAB        Ectopic        Molar        Multiple         Live Births                    Current birth control method: abstinence.    She is not sexually active.  In the past 12 months there has not been new sexual partners.      Past 6 month menstrual history:    Cycle Frequency: regular, predictable and consistent every 28 - 32 days    Menstrual cycle character: flow is typically normal   Cycle Duration: 4-5   Number of heavy days of flows: 0   Dysmenorrhea: mild and is not affecting her activities of daily living   PMS: is not affecting her activities of daily living   Intermenstrual bleeding present: no   Post-coital bleeding present: no     She exercises regularly: no.  She wears her seat belt:yes.  She has concerns about domestic violence: no.  Last colonoscopy: Never  Last DEXA: Never  Last MMG: Never  Last pap: 20  History of abnormal PAP: yes    The following portions of the patient's history were reviewed and updated as appropriate:problem list, current medications, allergies, past family history, past medical history, past social history and past surgical history.    Social History    Tobacco Use      Smoking status: Former Smoker        Quit date:         Years since quittin.3      Smokeless tobacco: Never Used    Social History     Substance and Sexual Activity   Alcohol Use Yes    Comment: occasionally      Review of Systems   Constitutional: Positive for unexpected weight change (weight gain). Negative for chills and fever.   Respiratory: Negative.    Cardiovascular: Negative.    Gastrointestinal: Negative.  Negative for constipation and diarrhea.   Endocrine: Negative.  Negative for cold intolerance and heat intolerance.   Genitourinary: Negative.  Negative for menstrual problem, vaginal bleeding, vaginal discharge and vaginal pain.   Skin: Negative.         hirsutism   Neurological: Negative for dizziness, syncope, light-headedness and headaches.   Psychiatric/Behavioral: Negative for suicidal ideas.        PMDD, anxiety, depression     "     Objective   /76   Pulse 96   Ht 165.1 cm (65\")   Wt 120 kg (265 lb 3.2 oz)   LMP 04/28/2021 (Exact Date)   Breastfeeding No   BMI 44.13 kg/m²     General:  well developed; well nourished  no acute distress  obese - Body mass index is 44.13 kg/m².   Skin:  No suspicious lesions seen   Thyroid: normal to inspection and palpation   Breasts:  Examined in supine position  Symmetric without masses or skin dimpling  Nipples normal without inversion, lesions or discharge  There are no palpable axillary nodes   Abdomen: soft, non-tender; no masses  no umbilical or inguinal hernias are present  no hepato-splenomegaly  Normal findings: bowel sounds normal   Cardiac: Heart sounds are normal.  Regular rate and rhythm without murmur, gallop or rub.   Resp: Normal expansion.  Clear to auscultation.  No rales, rhonchi, or wheezing.   Psych: alert,oriented, in NAD with a full range of affect, normal behavior and no psychotic features   Pelvis: Uterus:  Exam limited by  body habitus  Clinical staff was present for exam  External genitalia:  normal appearance of the external genitalia including Bartholin's and [place]'s glands.  :  urethral meatus normal;  Vaginal:  normal pink mucosa without prolapse or lesions  Cervix:  normal appearance.  Uterus:  normal size, shape and consistency  Adnexa:  normal bimanual exam of the adnexa.  Rectal:  digital rectal exam not performed; anus visually normal appearing.     Lab Review     Component      Latest Ref Rng & Units 5/14/2021   WBC      3.40 - 10.80 10*3/mm3 8.65   RBC      3.77 - 5.28 10*6/mm3 4.35   Hemoglobin      12.0 - 15.9 g/dL 13.3   Hematocrit      34.0 - 46.6 % 39.0   MCV      79.0 - 97.0 fL 89.7   MCH      26.6 - 33.0 pg 30.6   MCHC      31.5 - 35.7 g/dL 34.1   RDW      12.3 - 15.4 % 12.4   RDW-SD      37.0 - 54.0 fl 39.9   MPV      6.0 - 12.0 fL 9.0   Platelets      140 - 450 10*3/mm3 383   Neutrophil Rel %      42.7 - 76.0 % 65.3   Lymphocyte Rel %      19.6 - " 45.3 % 26.0   Monocyte Rel %      5.0 - 12.0 % 5.9   Eosinophil Rel %      0.3 - 6.2 % 1.6   Basophil Rel %      0.0 - 1.5 % 1.2   Neutrophils Absolute      1.70 - 7.00 10*3/mm3 5.65   Lymphocytes Absolute      0.70 - 3.10 10*3/mm3 2.25   Monocytes Absolute      0.10 - 0.90 10*3/mm3 0.51   Eosinophils Absolute      0.00 - 0.40 10*3/mm3 0.14   Basophils Absolute      0.00 - 0.20 10*3/mm3 0.10   Glucose      70 - 99 mg/dL 107 (H)   BUN      7 - 23 mg/dL 10   Creatinine      0.52 - 1.04 mg/dL 0.80   Sodium      137 - 145 mmol/L 135 (L)   Potassium      3.4 - 5.0 mmol/L 4.3   Chloride      101 - 112 mmol/L 101   CO2      22.0 - 30.0 mmol/L 25.0   Calcium      8.4 - 10.2 mg/dL 9.0   Total Protein      6.3 - 8.6 g/dL 7.1   Albumin      3.50 - 5.00 g/dL 3.90   ALT (SGPT)      <=35 U/L 20   AST (SGOT)      14 - 36 U/L 24   Alkaline Phosphatase      38 - 126 U/L 106   Total Bilirubin      0.2 - 1.3 mg/dL 0.3   eGFR Non  Am      64 - 149 mL/min/1.73 80   Globulin      2.3 - 3.5 gm/dL 3.2   A/G Ratio      1.1 - 1.8 g/dL 1.2   BUN/Creatinine Ratio      7.0 - 25.0 12.5   Anion Gap      5.0 - 15.0 mmol/L 9.0   Total Cholesterol      150 - 200 mg/dL 248 (H)   Triglycerides      <=150 mg/dL 150   HDL Cholesterol      40 - 59 mg/dL 35 (L)   LDL Cholesterol       <=100 mg/dL 185 (H)   VLDL Cholesterol      5 - 40 mg/dL 28   LDL/HDL Ratio      0.00 - 3.22 5.23 (H)   TSH Baseline      0.270 - 4.200 uIU/mL 3.430   Free T4      0.93 - 1.70 ng/dL 1.21   25 Hydroxy, Vitamin D      30.0 - 100.0 ng/ml 9.9 (L)   Vitamin B-12      211 - 946 pg/mL 283   Hemoglobin A1C      4.80 - 5.60 % 5.40     Imaging  No data reviewed       Diagnoses and all orders for this visit:    1. Encounter for gynecological examination with Papanicolaou smear of cervix (Primary)  -     Liquid-based Pap Smear, Screening    2. Laboratory examination ordered as part of a routine general medical examination  -     TSH  -     T4, Free  -     CBC Auto Differential  -      Comprehensive Metabolic Panel  -     Vitamin D 25 Hydroxy  -     Vitamin B12  -     Hemoglobin A1c  -     Lipid Panel    3. Morbid obesity with BMI of 40.0-44.9, adult (CMS/[facility])  -     buPROPion XL (WELLBUTRIN XL) 150 MG 24 hr tablet; Take 1 tablet by mouth Every Morning.  Dispense: 30 tablet; Refill: 5  -     TSH  -     T4, Free  -     CBC Auto Differential  -     Comprehensive Metabolic Panel  -     Vitamin D 25 Hydroxy  -     Vitamin B12  -     Hemoglobin A1c  -     Lipid Panel    4. PMDD (premenstrual dysphoric disorder)  -     medroxyPROGESTERone (PROVERA) 5 MG tablet; Take 1 tablet by mouth Daily.  Dispense: 30 tablet; Refill: 5  -     busPIRone (BUSPAR) 10 MG tablet; Take 1 tablet by mouth 2 (two) times a day.  Dispense: 60 tablet; Refill: 1    5. Irregular periods  -     medroxyPROGESTERone (PROVERA) 5 MG tablet; Take 1 tablet by mouth Daily.  Dispense: 30 tablet; Refill: 5    6. Current mild episode of major depressive disorder without prior episode (CMS/[facility])  -     buPROPion XL (WELLBUTRIN XL) 150 MG 24 hr tablet; Take 1 tablet by mouth Every Morning.  Dispense: 30 tablet; Refill: 5    7. Hypothyroidism (acquired)  -     levothyroxine (SYNTHROID, LEVOTHROID) 88 MCG tablet; Take 1 tablet by mouth Daily.  Dispense: 30 tablet; Refill: 5    8. Vitamin D deficiency  -     vitamin D (ERGOCALCIFEROL) 1.25 MG (78510 UT) capsule capsule; Take 1 capsule by mouth 1 (One) Time Per Week.  Dispense: 5 capsule; Refill: 5    Pap results: I will send card in mail or call if abnormal. RTC annually for well woman exams.     MMGs beginning annually at 42. Encouraged SBE monthly.     Thyroid labs are normal. Continue on synthroid 88mcg daily. Refills sent x 6 months.     Vit D is extremely low at only 9. Begin weekly supplement. Script sent to pharmacy.     B12 is on the low end of normal. Can start with OTC sublingual B12 supplement.     A1C is good but glucose is slightly elevated at 107. Cholesterol is elevated. Needs  to see PCP for further management of this. CBC is good. I spent 15 minutes specifically counseling on diet and exercise. I provided information on Weight watchers. We discussed sustainable lifestyle changes. Low carb, low fat, portion control. Encouraged exercise daily. Pt voices understanding and agrees with this plan.     Reduce Wellbutrin XL to 150mg in the AM since she is having issues with anxiety. Begin Buspar again but increase to 10mg BID every day. Not PRN as she was previously taking. Pt voices understanding of these changes.     Periods are well managed but PMDD is still an issue. I believe the wellbutrin and buspar changes will assist more than provera. Continue taking just 5mg daily of provera. Pt agrees with this plan.     FU in 3 months or sooner PRN.     This note was electronically signed.    Christianne Thompson, APRN  May 17, 2021

## 2021-05-18 LAB
LAB AP CASE REPORT: NORMAL
PATH INTERP SPEC-IMP: NORMAL

## 2021-06-28 DIAGNOSIS — F32.81 PMDD (PREMENSTRUAL DYSPHORIC DISORDER): ICD-10-CM

## 2021-06-28 RX ORDER — BUSPIRONE HYDROCHLORIDE 10 MG/1
10 TABLET ORAL 2 TIMES DAILY
Qty: 60 TABLET | Refills: 0 | Status: SHIPPED | OUTPATIENT
Start: 2021-06-28 | End: 2022-12-20 | Stop reason: SDUPTHER

## 2021-09-15 ENCOUNTER — LAB (OUTPATIENT)
Dept: LAB | Facility: OTHER | Age: 41
End: 2021-09-15

## 2021-09-15 PROCEDURE — U0004 COV-19 TEST NON-CDC HGH THRU: HCPCS | Performed by: NURSE PRACTITIONER

## 2021-09-19 ENCOUNTER — TELEPHONE (OUTPATIENT)
Dept: URGENT CARE | Facility: CLINIC | Age: 41
End: 2021-09-19

## 2021-09-19 DIAGNOSIS — J06.9 ACUTE URI: Primary | ICD-10-CM

## 2021-09-19 RX ORDER — AMOXICILLIN 500 MG/1
500 CAPSULE ORAL 2 TIMES DAILY
Qty: 20 CAPSULE | Refills: 0 | Status: SHIPPED | OUTPATIENT
Start: 2021-09-19 | End: 2021-09-29

## 2021-09-19 NOTE — TELEPHONE ENCOUNTER
Patient called requesting that her antibiotic be changed.  She states that the doxycycline prescribed on 9/15/21 caused her stomach upset so she stopped it a few days ago.  She reports that since stopping the doxycycline, her stomach complaints have resolved.  She states that she has taken amoxicillin in the past with no problems and would like to have this medication instead if possible.  Amoxicillin 500 mg BID x10 days is sent in to her pharmacy.  She is reminded to follow up with her PCP for a recheck and to go to the ER with any worsening or sudden changes in her condition. Understanding and agreement is verbalized.

## 2022-02-07 DIAGNOSIS — E55.9 VITAMIN D DEFICIENCY: ICD-10-CM

## 2022-02-07 DIAGNOSIS — F32.0 CURRENT MILD EPISODE OF MAJOR DEPRESSIVE DISORDER WITHOUT PRIOR EPISODE: ICD-10-CM

## 2022-02-07 DIAGNOSIS — E66.01 MORBID OBESITY WITH BMI OF 40.0-44.9, ADULT: ICD-10-CM

## 2022-02-07 DIAGNOSIS — E03.9 HYPOTHYROIDISM (ACQUIRED): ICD-10-CM

## 2022-02-07 RX ORDER — ERGOCALCIFEROL 1.25 MG/1
50000 CAPSULE ORAL WEEKLY
Qty: 5 CAPSULE | Refills: 5 | OUTPATIENT
Start: 2022-02-07

## 2022-02-07 RX ORDER — BUPROPION HYDROCHLORIDE 150 MG/1
150 TABLET ORAL EVERY MORNING
Qty: 30 TABLET | Refills: 5 | OUTPATIENT
Start: 2022-02-07

## 2022-02-07 RX ORDER — LEVOTHYROXINE SODIUM 88 UG/1
88 TABLET ORAL DAILY
Qty: 30 TABLET | Refills: 5 | OUTPATIENT
Start: 2022-02-07

## 2022-12-20 ENCOUNTER — LAB (OUTPATIENT)
Dept: LAB | Facility: OTHER | Age: 42
End: 2022-12-20

## 2022-12-20 ENCOUNTER — OFFICE VISIT (OUTPATIENT)
Dept: OBSTETRICS AND GYNECOLOGY | Facility: CLINIC | Age: 42
End: 2022-12-20

## 2022-12-20 VITALS
HEART RATE: 91 BPM | WEIGHT: 241 LBS | SYSTOLIC BLOOD PRESSURE: 110 MMHG | HEIGHT: 65 IN | BODY MASS INDEX: 40.15 KG/M2 | DIASTOLIC BLOOD PRESSURE: 76 MMHG

## 2022-12-20 DIAGNOSIS — L65.9 HAIR LOSS: ICD-10-CM

## 2022-12-20 DIAGNOSIS — Z01.419 ENCOUNTER FOR GYNECOLOGICAL EXAMINATION WITH PAPANICOLAOU SMEAR OF CERVIX: Primary | ICD-10-CM

## 2022-12-20 DIAGNOSIS — E03.9 HYPOTHYROIDISM (ACQUIRED): ICD-10-CM

## 2022-12-20 DIAGNOSIS — E66.01 MORBID OBESITY WITH BMI OF 40.0-44.9, ADULT: ICD-10-CM

## 2022-12-20 DIAGNOSIS — Z12.31 SCREENING MAMMOGRAM, ENCOUNTER FOR: ICD-10-CM

## 2022-12-20 DIAGNOSIS — F32.81 PMDD (PREMENSTRUAL DYSPHORIC DISORDER): ICD-10-CM

## 2022-12-20 DIAGNOSIS — F32.0 CURRENT MILD EPISODE OF MAJOR DEPRESSIVE DISORDER WITHOUT PRIOR EPISODE: ICD-10-CM

## 2022-12-20 DIAGNOSIS — R53.83 FATIGUE, UNSPECIFIED TYPE: ICD-10-CM

## 2022-12-20 LAB
ALBUMIN SERPL-MCNC: 4.3 G/DL (ref 3.5–5)
ALBUMIN/GLOB SERPL: 1.3 G/DL (ref 1.1–1.8)
ALP SERPL-CCNC: 68 U/L (ref 38–126)
ALT SERPL W P-5'-P-CCNC: 19 U/L
ANION GAP SERPL CALCULATED.3IONS-SCNC: 9 MMOL/L (ref 5–15)
AST SERPL-CCNC: 20 U/L (ref 14–36)
BILIRUB SERPL-MCNC: 0.4 MG/DL (ref 0.2–1.3)
BUN SERPL-MCNC: 7 MG/DL (ref 7–23)
BUN/CREAT SERPL: 9.7 (ref 7–25)
CALCIUM SPEC-SCNC: 8.5 MG/DL (ref 8.4–10.2)
CHLORIDE SERPL-SCNC: 104 MMOL/L (ref 101–112)
CHOLEST SERPL-MCNC: 198 MG/DL (ref 150–200)
CO2 SERPL-SCNC: 23 MMOL/L (ref 22–30)
CREAT SERPL-MCNC: 0.72 MG/DL (ref 0.52–1.04)
DEPRECATED RDW RBC AUTO: 40.8 FL (ref 37–54)
EGFRCR SERPLBLD CKD-EPI 2021: 108.6 ML/MIN/1.73
ERYTHROCYTE [DISTWIDTH] IN BLOOD BY AUTOMATED COUNT: 13 % (ref 12.3–15.4)
GLOBULIN UR ELPH-MCNC: 3.3 GM/DL (ref 2.3–3.5)
GLUCOSE SERPL-MCNC: 105 MG/DL (ref 70–99)
HBA1C MFR BLD: 5.6 % (ref 4.8–5.6)
HCT VFR BLD AUTO: 38.7 % (ref 34–46.6)
HDLC SERPL-MCNC: 32 MG/DL (ref 40–59)
HGB BLD-MCNC: 13.2 G/DL (ref 12–15.9)
LDLC SERPL CALC-MCNC: 145 MG/DL
LDLC/HDLC SERPL: 4.49 {RATIO} (ref 0–3.22)
MCH RBC QN AUTO: 30.1 PG (ref 26.6–33)
MCHC RBC AUTO-ENTMCNC: 34.1 G/DL (ref 31.5–35.7)
MCV RBC AUTO: 88.4 FL (ref 79–97)
PLATELET # BLD AUTO: 345 10*3/MM3 (ref 140–450)
PMV BLD AUTO: 9.5 FL (ref 6–12)
POTASSIUM SERPL-SCNC: 3.8 MMOL/L (ref 3.4–5)
PROT SERPL-MCNC: 7.6 G/DL (ref 6.3–8.6)
RBC # BLD AUTO: 4.38 10*6/MM3 (ref 3.77–5.28)
SODIUM SERPL-SCNC: 136 MMOL/L (ref 137–145)
TRIGL SERPL-MCNC: 112 MG/DL
VLDLC SERPL-MCNC: 21 MG/DL (ref 5–40)
WBC NRBC COR # BLD: 7.97 10*3/MM3 (ref 3.4–10.8)

## 2022-12-20 PROCEDURE — 80061 LIPID PANEL: CPT | Performed by: NURSE PRACTITIONER

## 2022-12-20 PROCEDURE — 80050 GENERAL HEALTH PANEL: CPT | Performed by: NURSE PRACTITIONER

## 2022-12-20 PROCEDURE — 99396 PREV VISIT EST AGE 40-64: CPT | Performed by: NURSE PRACTITIONER

## 2022-12-20 PROCEDURE — 82306 VITAMIN D 25 HYDROXY: CPT | Performed by: NURSE PRACTITIONER

## 2022-12-20 PROCEDURE — 84439 ASSAY OF FREE THYROXINE: CPT | Performed by: NURSE PRACTITIONER

## 2022-12-20 PROCEDURE — 82607 VITAMIN B-12: CPT | Performed by: NURSE PRACTITIONER

## 2022-12-20 PROCEDURE — 83036 HEMOGLOBIN GLYCOSYLATED A1C: CPT | Performed by: NURSE PRACTITIONER

## 2022-12-20 RX ORDER — BUSPIRONE HYDROCHLORIDE 10 MG/1
10 TABLET ORAL 2 TIMES DAILY
Qty: 60 TABLET | Refills: 5 | Status: SHIPPED | OUTPATIENT
Start: 2022-12-20

## 2022-12-20 RX ORDER — BUPROPION HYDROCHLORIDE 100 MG/1
100 TABLET ORAL
COMMUNITY
End: 2022-12-20 | Stop reason: SDUPTHER

## 2022-12-20 RX ORDER — BUPROPION HYDROCHLORIDE 150 MG/1
150 TABLET ORAL EVERY MORNING
Qty: 30 TABLET | Refills: 5 | Status: SHIPPED | OUTPATIENT
Start: 2022-12-20

## 2022-12-20 RX ORDER — MEDROXYPROGESTERONE ACETATE 5 MG/1
5 TABLET ORAL DAILY
Qty: 30 TABLET | Refills: 5 | Status: SHIPPED | OUTPATIENT
Start: 2022-12-20

## 2022-12-20 NOTE — PROGRESS NOTES
Subjective   Chief Complaint   Patient presents with   • Gynecologic Exam     WWREED Rendon is a 42 y.o. year old  presenting to be seen for her annual exam.  Today she is needing refills on Wellbutrin, buspar, vit D, synthroid and provera. She was supposed to follow up in Dec 2021 but did not come in. She has been without medications for nearly 1 year.     She has PMDD, obesity, hypothyroidism, and hirsutism. Has now notable hair thinning in the last 6 months  Recent COVID infection in August. Had no periods in August and September, then extremely heavy bleeding in October and November.     Her thyroid was previously well managed on synthroid 88mcg dose, I will recheck labs today before restarting. Pt had done well on Wellbutrin XL 150mg in the past with Buspar 10mg BID. She complains today of feeling anxious and worked up easily, worse after COVID. I warned of possible jittery/anxiousness with Wellbutrin, so pt will monitor.    Pt has hx of struggling with weight loss but states she has lost 23lb in 3 months after COVID.. She was 265lb 2021.     She previously had good control of PMDD and heavy periods on provera but states she was having trouble getting insurance to pay for it over a year ago. She would like to resume taking it.     Patient's last menstrual period was 2022 (exact date).    OB History        6    Para   3    Term                AB   3    Living   3       SAB   3    IAB        Ectopic        Molar        Multiple        Live Births                    Current birth control method: abstinence.    She is not sexually active.  In the past 12 months there has not been new sexual partners.      Past 6 month menstrual history:    Cycle Frequency: regular, predictable and consistent every 28 - 32 days   Skipped August and September   Menstrual cycle character: flow is typically moderately heavy   Cycle Duration: 6-7   Number of heavy days of flows: 4-5  "  Dysmenorrhea: severe and affecting her activities of daily living   PMS: severe and affecting her activities of daily living   Intermenstrual bleeding present: no   Post-coital bleeding present: no     She exercises regularly: no.  She wears her seat belt:yes.  She has concerns about domestic violence: no.  Last colonoscopy: Never  Last DEXA: Never  Last MMG: Never  Last pap: 5/14/21  History of abnormal PAP: yes    The following portions of the patient's history were reviewed and updated as appropriate:problem list, current medications, allergies, past family history, past medical history, past social history and past surgical history.    Social History    Tobacco Use      Smoking status: Former        Types: Cigarettes        Quit date: 2012        Years since quitting: 10.9      Smokeless tobacco: Never    Social History     Substance and Sexual Activity   Alcohol Use Not Currently    Comment: occasionally      Review of Systems   Constitutional: Positive for unexpected weight change (weight loss). Negative for chills and fever.   Respiratory: Negative.    Cardiovascular: Negative.    Gastrointestinal: Positive for nausea (since COVID ). Negative for constipation, diarrhea and vomiting.   Endocrine: Negative.  Negative for cold intolerance and heat intolerance.        Hypothyroidism   Genitourinary: Negative.  Negative for menstrual problem, vaginal bleeding, vaginal discharge and vaginal pain.   Skin: Negative.         Hirsutism, hair loss   Neurological: Negative for dizziness, syncope, light-headedness and headaches.   Psychiatric/Behavioral: Positive for decreased concentration. Negative for suicidal ideas. The patient is nervous/anxious.         PMDD         Objective   /76   Pulse 91   Ht 165.1 cm (65\")   Wt 109 kg (241 lb)   LMP 11/25/2022 (Exact Date)   Breastfeeding No   BMI 40.10 kg/m²     General:  well developed; well nourished  no acute distress  obese - Body mass index is 40.1 kg/m². "   Skin:  No suspicious lesions seen   Thinning hair all over the scalp  hirsutism   Thyroid: normal to inspection and palpation   Breasts:  Examined in supine position  Symmetric without masses or skin dimpling  Nipples normal without inversion, lesions or discharge  There are no palpable axillary nodes   Abdomen: soft, non-tender; no masses  no umbilical or inguinal hernias are present  no hepato-splenomegaly  Normal findings: bowel sounds normal   Cardiac: Heart sounds are normal.  Regular rate and rhythm without murmur, gallop or rub.   Resp: Normal expansion.  Clear to auscultation.  No rales, rhonchi, or wheezing.   Psych: alert,oriented, in NAD with a full range of affect, normal behavior and no psychotic features   Pelvis: Uterus:  Exam limited by  body habitus  Clinical staff was present for exam  External genitalia:  normal appearance of the external genitalia including Bartholin's and [place]'s glands.  :  urethral meatus normal;  Vaginal:  normal pink mucosa without prolapse or lesions  Cervix:  normal appearance.  Uterus:  normal size, shape and consistency  Adnexa:  normal bimanual exam of the adnexa.  Rectal:  digital rectal exam not performed; anus visually normal appearing.     Labs  No data reviewed.     Imaging  No data reviewed       Diagnoses and all orders for this visit:    1. Encounter for gynecological examination with Papanicolaou smear of cervix (Primary)  -     LIQUID-BASED PAP SMEAR, [facility] ([facility])    2. Screening mammogram, encounter for  -     Mammo Screening Digital Tomosynthesis Bilateral With CAD; Future  -     medroxyPROGESTERone (Provera) 5 MG tablet; Take 1 tablet by mouth Daily.  Dispense: 30 tablet; Refill: 5    3. PMDD (premenstrual dysphoric disorder)  -     busPIRone (BUSPAR) 10 MG tablet; Take 1 tablet by mouth 2 (Two) Times a Day.  Dispense: 60 tablet; Refill: 5    4. Hypothyroidism (acquired)  -     TSH  -     T4, Free    5. Hair loss  -     TSH  -     T4, Free  -      CBC (No Diff)  -     Comprehensive Metabolic Panel  -     Vitamin D 25 Hydroxy  -     Vitamin B12    6. Fatigue, unspecified type  -     TSH  -     T4, Free  -     CBC (No Diff)  -     Comprehensive Metabolic Panel  -     Vitamin D 25 Hydroxy  -     Vitamin B12  -     Lipid Panel  -     Hemoglobin A1c    7. Morbid obesity with BMI of 40.0-44.9, adult (HCC)  -     buPROPion XL (WELLBUTRIN XL) 150 MG 24 hr tablet; Take 1 tablet by mouth Every Morning.  Dispense: 30 tablet; Refill: 5    8. Current mild episode of major depressive disorder without prior episode (HCC)  -     buPROPion XL (WELLBUTRIN XL) 150 MG 24 hr tablet; Take 1 tablet by mouth Every Morning.  Dispense: 30 tablet; Refill: 5    Pap results: I will send card in mail or call if abnormal. RTC annually for well woman exams.     MMG recommended annually. Never had one. Scheduled for 1/23/22. Encouraged SBE monthly.     Colonoscopy not indicated in her age.     Not sexually active. Declines need for contraceptive.     Pt is overdue for labs. She does not have a PCP. She is fasting today. Obtain the labs listed above. We will restart synthroid and Vit D based on results. Will go ahead and reinitiate Wellbutrin XL 150mg in the AM, Buspar 10mg BID, and provera daily. Start 1 at a time each week. Warned of potential for worsening anxiety on Wellbutrin. If that occurs, we will try zoloft. Pt voices understanding.     Praised for weight loss but encouraged diet and exercise for continued efforts.     FU in 6 months or sooner PRN and pending results.     This note was electronically signed.    Christianne Thompson, APRN  December 20, 2022

## 2022-12-21 LAB
25(OH)D3 SERPL-MCNC: 44.1 NG/ML (ref 30–100)
T4 FREE SERPL-MCNC: 1.11 NG/DL (ref 0.93–1.7)
TSH SERPL DL<=0.05 MIU/L-ACNC: 10.8 UIU/ML (ref 0.27–4.2)
VIT B12 BLD-MCNC: 484 PG/ML (ref 211–946)

## 2022-12-22 DIAGNOSIS — E03.9 HYPOTHYROIDISM (ACQUIRED): ICD-10-CM

## 2022-12-22 LAB — REF LAB TEST METHOD: NORMAL

## 2022-12-22 RX ORDER — LEVOTHYROXINE SODIUM 88 UG/1
88 TABLET ORAL DAILY
Qty: 30 TABLET | Refills: 5 | Status: SHIPPED | OUTPATIENT
Start: 2022-12-22

## 2022-12-22 NOTE — PROGRESS NOTES
TSH 10. T4 WNL. Restart synthroid 88 mcg. RTC in 6 months, pt requests 5 months due to kids being out of school by the 6 month ana maria.

## 2023-07-24 DIAGNOSIS — E66.01 MORBID OBESITY WITH BMI OF 40.0-44.9, ADULT: ICD-10-CM

## 2023-07-24 DIAGNOSIS — F32.81 PMDD (PREMENSTRUAL DYSPHORIC DISORDER): ICD-10-CM

## 2023-07-24 DIAGNOSIS — E03.9 HYPOTHYROIDISM (ACQUIRED): ICD-10-CM

## 2023-07-24 DIAGNOSIS — F32.0 CURRENT MILD EPISODE OF MAJOR DEPRESSIVE DISORDER WITHOUT PRIOR EPISODE: ICD-10-CM

## 2023-07-24 RX ORDER — BUPROPION HYDROCHLORIDE 150 MG/1
150 TABLET ORAL EVERY MORNING
Qty: 30 TABLET | Refills: 5 | Status: SHIPPED | OUTPATIENT
Start: 2023-07-24

## 2023-07-24 RX ORDER — LEVOTHYROXINE SODIUM 88 UG/1
88 TABLET ORAL DAILY
Qty: 30 TABLET | Refills: 5 | Status: SHIPPED | OUTPATIENT
Start: 2023-07-24

## 2023-07-25 RX ORDER — BUSPIRONE HYDROCHLORIDE 10 MG/1
10 TABLET ORAL 2 TIMES DAILY
Qty: 60 TABLET | Refills: 5 | OUTPATIENT
Start: 2023-07-25